# Patient Record
Sex: FEMALE | Race: WHITE | Employment: FULL TIME | URBAN - METROPOLITAN AREA
[De-identification: names, ages, dates, MRNs, and addresses within clinical notes are randomized per-mention and may not be internally consistent; named-entity substitution may affect disease eponyms.]

---

## 2018-10-11 ENCOUNTER — HOSPITAL ENCOUNTER (OUTPATIENT)
Dept: MAMMOGRAPHY | Age: 48
Discharge: HOME | End: 2018-10-13
Payer: COMMERCIAL

## 2018-10-11 DIAGNOSIS — Z12.39 BREAST SCREENING: ICD-10-CM

## 2018-10-11 PROCEDURE — 77067 SCR MAMMO BI INCL CAD: CPT

## 2020-08-19 ENCOUNTER — HOSPITAL ENCOUNTER (OUTPATIENT)
Dept: MAMMOGRAPHY | Age: 50
Discharge: HOME | End: 2020-08-21
Payer: COMMERCIAL

## 2020-08-19 PROCEDURE — 77067 SCR MAMMO BI INCL CAD: CPT

## 2021-01-25 LAB
ALBUMIN SERPL-MCNC: NORMAL G/DL
ALP BLD-CCNC: NORMAL U/L
ALT SERPL-CCNC: NORMAL U/L
ANION GAP SERPL CALCULATED.3IONS-SCNC: NORMAL MMOL/L
AST SERPL-CCNC: NORMAL U/L
BILIRUB SERPL-MCNC: NORMAL MG/DL
BUN BLDV-MCNC: NORMAL MG/DL
CALCIUM SERPL-MCNC: NORMAL MG/DL
CHLORIDE BLD-SCNC: NORMAL MMOL/L
CHOLESTEROL, TOTAL: NORMAL
CHOLESTEROL/HDL RATIO: NORMAL
CO2: NORMAL
CREAT SERPL-MCNC: NORMAL MG/DL
GFR CALCULATED: NORMAL
GLUCOSE BLD-MCNC: NORMAL MG/DL
HDLC SERPL-MCNC: NORMAL MG/DL
LDL CHOLESTEROL CALCULATED: NORMAL
NONHDLC SERPL-MCNC: NORMAL MG/DL
POTASSIUM SERPL-SCNC: NORMAL MMOL/L
SODIUM BLD-SCNC: NORMAL MMOL/L
TOTAL PROTEIN: NORMAL
TRIGL SERPL-MCNC: NORMAL MG/DL
VLDLC SERPL CALC-MCNC: NORMAL MG/DL

## 2021-06-17 RX ORDER — CLINDAMYCIN PHOSPHATE 10 MG/G
GEL TOPICAL 2 TIMES DAILY
COMMUNITY
End: 2022-04-14 | Stop reason: SDUPTHER

## 2022-04-14 ENCOUNTER — OFFICE VISIT (OUTPATIENT)
Dept: PRIMARY CARE CLINIC | Age: 52
End: 2022-04-14
Payer: COMMERCIAL

## 2022-04-14 VITALS
OXYGEN SATURATION: 99 % | SYSTOLIC BLOOD PRESSURE: 118 MMHG | HEART RATE: 79 BPM | DIASTOLIC BLOOD PRESSURE: 76 MMHG | BODY MASS INDEX: 27.72 KG/M2 | WEIGHT: 193.6 LBS | TEMPERATURE: 97.7 F | HEIGHT: 70 IN

## 2022-04-14 DIAGNOSIS — E78.49 HYPERLIPIDEMIA, FAMILIAL, HIGH LDL: Primary | ICD-10-CM

## 2022-04-14 DIAGNOSIS — Z12.31 BREAST CANCER SCREENING BY MAMMOGRAM: ICD-10-CM

## 2022-04-14 DIAGNOSIS — D23.9 SEBACEOUS ADENOMA: ICD-10-CM

## 2022-04-14 DIAGNOSIS — E04.1 LEFT THYROID NODULE: ICD-10-CM

## 2022-04-14 DIAGNOSIS — E83.52 HYPERCALCEMIA SYNDROME: ICD-10-CM

## 2022-04-14 PROCEDURE — 3017F COLORECTAL CA SCREEN DOC REV: CPT | Performed by: INTERNAL MEDICINE

## 2022-04-14 PROCEDURE — 4004F PT TOBACCO SCREEN RCVD TLK: CPT | Performed by: INTERNAL MEDICINE

## 2022-04-14 PROCEDURE — G8427 DOCREV CUR MEDS BY ELIG CLIN: HCPCS | Performed by: INTERNAL MEDICINE

## 2022-04-14 PROCEDURE — 99214 OFFICE O/P EST MOD 30 MIN: CPT | Performed by: INTERNAL MEDICINE

## 2022-04-14 PROCEDURE — G8419 CALC BMI OUT NRM PARAM NOF/U: HCPCS | Performed by: INTERNAL MEDICINE

## 2022-04-14 RX ORDER — CLINDAMYCIN PHOSPHATE 10 MG/G
GEL TOPICAL 2 TIMES DAILY
Qty: 30 G | Refills: 0 | Status: SHIPPED | OUTPATIENT
Start: 2022-04-14

## 2022-04-14 SDOH — ECONOMIC STABILITY: FOOD INSECURITY: WITHIN THE PAST 12 MONTHS, THE FOOD YOU BOUGHT JUST DIDN'T LAST AND YOU DIDN'T HAVE MONEY TO GET MORE.: NEVER TRUE

## 2022-04-14 SDOH — ECONOMIC STABILITY: FOOD INSECURITY: WITHIN THE PAST 12 MONTHS, YOU WORRIED THAT YOUR FOOD WOULD RUN OUT BEFORE YOU GOT MONEY TO BUY MORE.: NEVER TRUE

## 2022-04-14 ASSESSMENT — PATIENT HEALTH QUESTIONNAIRE - PHQ9
SUM OF ALL RESPONSES TO PHQ9 QUESTIONS 1 & 2: 0
1. LITTLE INTEREST OR PLEASURE IN DOING THINGS: 0
2. FEELING DOWN, DEPRESSED OR HOPELESS: 0
SUM OF ALL RESPONSES TO PHQ QUESTIONS 1-9: 0

## 2022-04-14 ASSESSMENT — SOCIAL DETERMINANTS OF HEALTH (SDOH): HOW HARD IS IT FOR YOU TO PAY FOR THE VERY BASICS LIKE FOOD, HOUSING, MEDICAL CARE, AND HEATING?: NOT HARD AT ALL

## 2022-04-14 NOTE — PROGRESS NOTES
Chief Complaint   Patient presents with    1 Year Follow Up     Pt is here as a 1 year F/U, with no c/o discomfort at this time, but pt has a spot on her right upper lip that she states appeared almost 1 year ago. HPI:  Patient is here after almost a year and a half for follow-up of hyperlipidemia and rosacea. She is doing very well she had stopped her Crestor almost a year ago thinking that since her last blood work was doing well and she has been exercising and losing the weight that she would stop it. She denies any current complaints of any shortness of breath chest pains abdominal pains nausea vomiting or diarrhea. She has been walking on a daily basis. She is due for her mammogram which will be ordered. Patient was counseled to have her colonoscopy and she agreed except that she would like to postpone it for few month because she is starting a new job in 2 weeks. She was also advised to get the shingles vaccine. She was counseled in length to get the blood work and figure out if she still needs the medication or not. .  Patient complains of a lesion on the nasolabial fold on the right side suggestive of a clogged sebaceous cyst.  She would like it removed it is about 0.2 centimeter in diameter. She will be referred to plastic surgery. .    Past Medical History, Surgical History, and Family History has been reviewed and updated.     Review of Systems:  Constitutional:  No fever, no fatigue, no chills, no headaches, no weight change  Dermatology:  No rash, no mole, no dry or sensitive skin  ENT:  No cough, no sore throat, no sinus pain, no runny nose, no ear pain  Cardiology:  No chest pain, no palpitations, no leg edema, no shortness of breath, no PND  Gastroenterology:  No dysphagia, no abdominal pain, no nausea, no vomiting, no constipation, no diarrhea, no heartburn  Musculoskeletal:  No joint pain, no leg cramps, no back pain, no muscle aches  Respiratory:  No shortness of breath, no orthopnea, no wheezing, no CONNOR, no hemoptysis  Urology:  No blood in the urine, no urinary frequency, no urinary incontinence, no urinary urgency, no nocturia, no dysuria    Vitals:    04/14/22 1420   BP: 118/76   Pulse: 79   Temp: 97.7 °F (36.5 °C)   TempSrc: Temporal   SpO2: 99%   Weight: 193 lb 9.6 oz (87.8 kg)   Height: 5' 10\" (1.778 m)       General:  Patient alert and oriented x 3, NAD, pleasant  HEENT:  Atraumatic, normocephalic, PERRLA, EOMI, clear conjunctiva, TMs clear, nose-clear, throat - no erythema  Neck:  Supple, no goiter, no carotid bruits, no LAD  Lungs:  CTA   Heart:  RRR, no murmurs, gallops or rubs  Abdomen:  Soft/nt/nd, + bowel sounds  Extremities:  No clubbing, cyanosis or edema  Skin: unremarkable    Sodium   Date Value Ref Range Status   10/10/2015 141 132 - 146 mmol/L Final     Potassium   Date Value Ref Range Status   10/10/2015 4.3 3.5 - 5.0 mmol/L Final     Chloride   Date Value Ref Range Status   10/10/2015 104 98 - 107 mmol/L Final     CO2   Date Value Ref Range Status   10/10/2015 27 22 - 29 mmol/L Final     BUN   Date Value Ref Range Status   10/10/2015 9 6 - 20 mg/dL Final     CREATININE   Date Value Ref Range Status   10/10/2015 1.0 0.5 - 1.0 mg/dL Final     Glucose   Date Value Ref Range Status   10/10/2015 103 74 - 109 mg/dL Final     Calcium   Date Value Ref Range Status   10/10/2015 11.3 (H) 8.6 - 10.2 mg/dL Final     Total Protein   Date Value Ref Range Status   10/09/2015 6.3 (L) 6.4 - 8.3 g/dL Final     Albumin   Date Value Ref Range Status   10/09/2015 3.5 3.5 - 5.2 g/dL Final     Total Bilirubin   Date Value Ref Range Status   10/09/2015 0.2 0.0 - 1.2 mg/dL Final     Alkaline Phosphatase   Date Value Ref Range Status   10/09/2015 112 (H) 35 - 104 U/L Final     AST   Date Value Ref Range Status   10/09/2015 22 0 - 31 U/L Final     ALT   Date Value Ref Range Status   10/09/2015 30 0 - 32 U/L Final     GFR Non-   Date Value Ref Range Status   10/10/2015 60 >=60 mL/min/1.73 Final     Comment:     Chronic Kidney Disease: less than 60 ml/min/1.73 sq.m. Kidney Failure: less than 15 ml/min/1.73 sq.m. Results valid for patients 18 years and older. GFR    Date Value Ref Range Status   10/10/2015 >60  Final        No results found. Assessment/Plan:      Outpatient Encounter Medications as of 4/14/2022   Medication Sig Dispense Refill    clindamycin (CLINDAGEL) 1 % gel Apply topically 2 times daily Apply topically 2 times daily. 30 g 0    [DISCONTINUED] clindamycin (CLINDAGEL) 1 % gel Apply topically 2 times daily Apply topically 2 times daily.  [DISCONTINUED] rosuvastatin (CRESTOR) 10 MG tablet Take 10 mg by mouth nightly (Patient not taking: Reported on 4/14/2022)       No facility-administered encounter medications on file as of 4/14/2022. José Nino was seen today for 1 year follow up. Diagnoses and all orders for this visit:    Hyperlipidemia, familial, high LDL  -     Lipid Panel; Future  -     Comprehensive Metabolic Panel; Future    Breast cancer screening by mammogram  -     PAULA DIGITAL SCREEN W OR WO CAD BILATERAL; Future    Hypercalcemia syndrome  -     CBC with Auto Differential; Future  -     Vitamin D 25 Hydroxy; Future  -     Urinalysis; Future  -     Comprehensive Metabolic Panel; Future    Left thyroid nodule  -     TSH; Future    Sebaceous adenoma  -     Lorenzo Alejandre MD, Plastic & Reconstructive Surgery, [place]    Other orders  -     clindamycin (CLINDAGEL) 1 % gel; Apply topically 2 times daily Apply topically 2 times daily. There are no Patient Instructions on file for this visit. On this date 4/14/2022 I have spent 30 minutes reviewing previous notes, test results and face to face with the patient discussing the diagnosis and importance of compliance with the treatment plan as well as documenting on the day of the visit.       Viry Mota MD   4/14/22

## 2022-04-18 DIAGNOSIS — E04.1 LEFT THYROID NODULE: ICD-10-CM

## 2022-04-18 DIAGNOSIS — E78.49 HYPERLIPIDEMIA, FAMILIAL, HIGH LDL: ICD-10-CM

## 2022-04-18 DIAGNOSIS — E83.52 HYPERCALCEMIA SYNDROME: ICD-10-CM

## 2022-04-18 LAB
BASOPHILS ABSOLUTE: 0.04 E9/L (ref 0–0.2)
BASOPHILS RELATIVE PERCENT: 0.6 % (ref 0–2)
EOSINOPHILS ABSOLUTE: 0.15 E9/L (ref 0.05–0.5)
EOSINOPHILS RELATIVE PERCENT: 2.4 % (ref 0–6)
HCT VFR BLD CALC: 41.9 % (ref 34–48)
HEMOGLOBIN: 13.5 G/DL (ref 11.5–15.5)
IMMATURE GRANULOCYTES #: 0.01 E9/L
IMMATURE GRANULOCYTES %: 0.2 % (ref 0–5)
LYMPHOCYTES ABSOLUTE: 2.8 E9/L (ref 1.5–4)
LYMPHOCYTES RELATIVE PERCENT: 45.2 % (ref 20–42)
MCH RBC QN AUTO: 29.2 PG (ref 26–35)
MCHC RBC AUTO-ENTMCNC: 32.2 % (ref 32–34.5)
MCV RBC AUTO: 90.7 FL (ref 80–99.9)
MONOCYTES ABSOLUTE: 0.57 E9/L (ref 0.1–0.95)
MONOCYTES RELATIVE PERCENT: 9.2 % (ref 2–12)
NEUTROPHILS ABSOLUTE: 2.62 E9/L (ref 1.8–7.3)
NEUTROPHILS RELATIVE PERCENT: 42.4 % (ref 43–80)
PDW BLD-RTO: 12.4 FL (ref 11.5–15)
PLATELET # BLD: 218 E9/L (ref 130–450)
PMV BLD AUTO: 11 FL (ref 7–12)
RBC # BLD: 4.62 E12/L (ref 3.5–5.5)
WBC # BLD: 6.2 E9/L (ref 4.5–11.5)

## 2022-04-19 LAB
ALBUMIN SERPL-MCNC: 4.2 G/DL (ref 3.5–5.2)
ALP BLD-CCNC: 65 U/L (ref 35–104)
ALT SERPL-CCNC: 27 U/L (ref 0–32)
ANION GAP SERPL CALCULATED.3IONS-SCNC: 12 MMOL/L (ref 7–16)
AST SERPL-CCNC: 31 U/L (ref 0–31)
BILIRUB SERPL-MCNC: 0.4 MG/DL (ref 0–1.2)
BUN BLDV-MCNC: 11 MG/DL (ref 6–20)
CALCIUM SERPL-MCNC: 9.2 MG/DL (ref 8.6–10.2)
CHLORIDE BLD-SCNC: 107 MMOL/L (ref 98–107)
CHOLESTEROL, TOTAL: 179 MG/DL (ref 0–199)
CO2: 25 MMOL/L (ref 22–29)
CREAT SERPL-MCNC: 0.9 MG/DL (ref 0.5–1)
GFR AFRICAN AMERICAN: >60
GFR NON-AFRICAN AMERICAN: >60 ML/MIN/1.73
GLUCOSE BLD-MCNC: 117 MG/DL (ref 74–99)
HDLC SERPL-MCNC: 42 MG/DL
LDL CHOLESTEROL CALCULATED: 112 MG/DL (ref 0–99)
POTASSIUM SERPL-SCNC: 3.9 MMOL/L (ref 3.5–5)
SODIUM BLD-SCNC: 144 MMOL/L (ref 132–146)
TOTAL PROTEIN: 7.1 G/DL (ref 6.4–8.3)
TRIGL SERPL-MCNC: 127 MG/DL (ref 0–149)
TSH SERPL DL<=0.05 MIU/L-ACNC: 2.53 UIU/ML (ref 0.27–4.2)
VITAMIN D 25-HYDROXY: 31 NG/ML (ref 30–100)
VLDLC SERPL CALC-MCNC: 25 MG/DL

## 2022-04-20 DIAGNOSIS — E83.52 HYPERCALCEMIA SYNDROME: ICD-10-CM

## 2022-04-20 LAB
BACTERIA: NORMAL /HPF
BILIRUBIN URINE: NEGATIVE
BLOOD, URINE: ABNORMAL
CLARITY: CLEAR
COLOR: YELLOW
EPITHELIAL CELLS, UA: NORMAL /HPF
GLUCOSE URINE: NEGATIVE MG/DL
KETONES, URINE: NEGATIVE MG/DL
LEUKOCYTE ESTERASE, URINE: ABNORMAL
NITRITE, URINE: NEGATIVE
PH UA: 6 (ref 5–9)
PROTEIN UA: NEGATIVE MG/DL
RBC UA: NORMAL /HPF (ref 0–2)
SPECIFIC GRAVITY UA: 1.02 (ref 1–1.03)
UROBILINOGEN, URINE: 0.2 E.U./DL
WBC UA: NORMAL /HPF (ref 0–5)

## 2022-04-21 ENCOUNTER — OFFICE VISIT (OUTPATIENT)
Dept: PRIMARY CARE CLINIC | Age: 52
End: 2022-04-21
Payer: COMMERCIAL

## 2022-04-21 VITALS
SYSTOLIC BLOOD PRESSURE: 122 MMHG | HEART RATE: 86 BPM | OXYGEN SATURATION: 95 % | DIASTOLIC BLOOD PRESSURE: 60 MMHG | TEMPERATURE: 97.8 F | BODY MASS INDEX: 27.75 KG/M2 | WEIGHT: 193.8 LBS | HEIGHT: 70 IN

## 2022-04-21 DIAGNOSIS — R73.01 IFG (IMPAIRED FASTING GLUCOSE): ICD-10-CM

## 2022-04-21 DIAGNOSIS — E55.9 VITAMIN D DEFICIENCY: ICD-10-CM

## 2022-04-21 DIAGNOSIS — E78.2 MODERATE MIXED HYPERLIPIDEMIA NOT REQUIRING STATIN THERAPY: Primary | ICD-10-CM

## 2022-04-21 PROCEDURE — 3017F COLORECTAL CA SCREEN DOC REV: CPT | Performed by: INTERNAL MEDICINE

## 2022-04-21 PROCEDURE — G8419 CALC BMI OUT NRM PARAM NOF/U: HCPCS | Performed by: INTERNAL MEDICINE

## 2022-04-21 PROCEDURE — 4004F PT TOBACCO SCREEN RCVD TLK: CPT | Performed by: INTERNAL MEDICINE

## 2022-04-21 PROCEDURE — 99213 OFFICE O/P EST LOW 20 MIN: CPT | Performed by: INTERNAL MEDICINE

## 2022-04-21 PROCEDURE — G8427 DOCREV CUR MEDS BY ELIG CLIN: HCPCS | Performed by: INTERNAL MEDICINE

## 2022-04-21 NOTE — PROGRESS NOTES
Chief Complaint   Patient presents with    Hyperlipidemia     Pt is here as a 1 week F/U, with labs drawn on 4/18 and available for review. HPI:  Patient is here for follow-up of hyperlipidemia impaired glucose and hypovitaminosis D. She is doing very well without any new complaints. Blood work was discussed with patient appears within reasonable range except for mild impaired fasting hyperglycemia with fasting blood sugar of 117 but she had a big load of sugar the night before  Her lipid panel was discussed with her and her LDL is down to 112 just with diet alone. She was counseled about the colonoscopy but she cannot get it done yet because she is transferring to a new job and she has not gotten over that until May. She was encouraged to come back in 6 months to follow-up on her fasting hyperglycemia and to schedule her colonoscopy and mammogram..    Past Medical History, Surgical History, and Family History has been reviewed and updated.     Review of Systems:  Constitutional:  No fever, no fatigue, no chills, no headaches, no weight change  Dermatology:  No rash, no mole, no dry or sensitive skin  ENT:  No cough, no sore throat, no sinus pain, no runny nose, no ear pain  Cardiology:  No chest pain, no palpitations, no leg edema, no shortness of breath, no PND  Gastroenterology:  No dysphagia, no abdominal pain, no nausea, no vomiting, no constipation, no diarrhea, no heartburn  Musculoskeletal:  No joint pain, no leg cramps, no back pain, no muscle aches  Respiratory:  No shortness of breath, no orthopnea, no wheezing, no CONNOR, no hemoptysis  Urology:  No blood in the urine, no urinary frequency, no urinary incontinence, no urinary urgency, no nocturia, no dysuria    Vitals:    04/21/22 1355   BP: 122/60   Pulse: 86   Temp: 97.8 °F (36.6 °C)   TempSrc: Temporal   SpO2: 95%   Weight: 193 lb 12.8 oz (87.9 kg)   Height: 5' 10\" (1.778 m)       General:  Patient alert and oriented x 3, NAD, pleasant  HEENT: Atraumatic, normocephalic, PERRLA, EOMI, clear conjunctiva, TMs clear, nose-clear, throat - no erythema  Neck:  Supple, no goiter, no carotid bruits, no LAD  Lungs:  CTA   Heart:  RRR, no murmurs, gallops or rubs  Abdomen:  Soft/nt/nd, + bowel sounds  Extremities:  No clubbing, cyanosis or edema  Skin: unremarkable    Cholesterol, Total   Date Value Ref Range Status   04/18/2022 179 0 - 199 mg/dL Final     Triglycerides   Date Value Ref Range Status   04/18/2022 127 0 - 149 mg/dL Final     HDL   Date Value Ref Range Status   04/18/2022 42 >40 mg/dL Final     LDL Calculated   Date Value Ref Range Status   04/18/2022 112 (H) 0 - 99 mg/dL Final     VLDL Cholesterol Calculated   Date Value Ref Range Status   04/18/2022 25 mg/dL Final     Sodium   Date Value Ref Range Status   04/18/2022 144 132 - 146 mmol/L Final     Potassium   Date Value Ref Range Status   04/18/2022 3.9 3.5 - 5.0 mmol/L Final     Chloride   Date Value Ref Range Status   04/18/2022 107 98 - 107 mmol/L Final     CO2   Date Value Ref Range Status   04/18/2022 25 22 - 29 mmol/L Final     BUN   Date Value Ref Range Status   04/18/2022 11 6 - 20 mg/dL Final     CREATININE   Date Value Ref Range Status   04/18/2022 0.9 0.5 - 1.0 mg/dL Final     Glucose   Date Value Ref Range Status   04/18/2022 117 (H) 74 - 99 mg/dL Final     Calcium   Date Value Ref Range Status   04/18/2022 9.2 8.6 - 10.2 mg/dL Final     Total Protein   Date Value Ref Range Status   04/18/2022 7.1 6.4 - 8.3 g/dL Final     Albumin   Date Value Ref Range Status   04/18/2022 4.2 3.5 - 5.2 g/dL Final     Total Bilirubin   Date Value Ref Range Status   04/18/2022 0.4 0.0 - 1.2 mg/dL Final     Alkaline Phosphatase   Date Value Ref Range Status   04/18/2022 65 35 - 104 U/L Final     AST   Date Value Ref Range Status   04/18/2022 31 0 - 31 U/L Final     ALT   Date Value Ref Range Status   04/18/2022 27 0 - 32 U/L Final     GFR Non-   Date Value Ref Range Status   04/18/2022 >60 >=60 mL/min/1.73 Final     Comment:     Chronic Kidney Disease: less than 60 ml/min/1.73 sq.m. Kidney Failure: less than 15 ml/min/1.73 sq.m. Results valid for patients 18 years and older. GFR    Date Value Ref Range Status   04/18/2022 >60  Final        No results found. Assessment/Plan:      Outpatient Encounter Medications as of 4/21/2022   Medication Sig Dispense Refill    clindamycin (CLINDAGEL) 1 % gel Apply topically 2 times daily Apply topically 2 times daily. 30 g 0     No facility-administered encounter medications on file as of 4/21/2022. [facility] was seen today for hyperlipidemia. Diagnoses and all orders for this visit:    Moderate mixed hyperlipidemia not requiring statin therapy    IFG (impaired fasting glucose)    Vitamin D deficiency         There are no Patient Instructions on file for this visit. On this date 4/21/2022 I have spent 25 minutes reviewing previous notes, test results and face to face with the patient discussing the diagnosis and importance of compliance with the treatment plan as well as documenting on the day of the visit.       Zaynab Campos MD   4/21/22

## 2022-06-24 ENCOUNTER — OFFICE VISIT (OUTPATIENT)
Dept: SURGERY | Age: 52
End: 2022-06-24
Payer: COMMERCIAL

## 2022-06-24 VITALS
WEIGHT: 192 LBS | SYSTOLIC BLOOD PRESSURE: 122 MMHG | HEIGHT: 71 IN | DIASTOLIC BLOOD PRESSURE: 78 MMHG | BODY MASS INDEX: 26.88 KG/M2 | HEART RATE: 83 BPM | OXYGEN SATURATION: 97 % | TEMPERATURE: 98 F

## 2022-06-24 DIAGNOSIS — K13.0 LESION OF LIP: Primary | ICD-10-CM

## 2022-06-24 PROCEDURE — 99204 OFFICE O/P NEW MOD 45 MIN: CPT | Performed by: PLASTIC SURGERY

## 2022-06-24 RX ORDER — LIDOCAINE HYDROCHLORIDE AND EPINEPHRINE 10; 10 MG/ML; UG/ML
20 INJECTION, SOLUTION INFILTRATION; PERINEURAL ONCE
Status: SHIPPED | OUTPATIENT
Start: 2022-06-24

## 2022-06-24 RX ORDER — LIDOCAINE HYDROCHLORIDE AND EPINEPHRINE 10; 10 MG/ML; UG/ML
3 INJECTION, SOLUTION INFILTRATION; PERINEURAL ONCE
Status: COMPLETED | OUTPATIENT
Start: 2022-06-24 | End: 2022-06-24

## 2022-06-24 RX ORDER — LIDOCAINE HYDROCHLORIDE AND EPINEPHRINE 10; 10 MG/ML; UG/ML
20 INJECTION, SOLUTION INFILTRATION; PERINEURAL ONCE
Qty: 20 ML | Refills: 0 | Status: SHIPPED
Start: 2022-06-24 | End: 2022-06-24

## 2022-06-24 RX ADMIN — LIDOCAINE HYDROCHLORIDE AND EPINEPHRINE 3 ML: 10; 10 INJECTION, SOLUTION INFILTRATION; PERINEURAL at 10:50

## 2022-06-24 NOTE — PROGRESS NOTES
Department of Plastic Surgery - Adult  Attending Consult Note      CHIEF COMPLAINT:   Mass of right face     History Obtained From:  patient    HISTORY OF PRESENT ILLNESS:                The patient is a 46 y.o. female who presents with right face/superior to upper right lip mass. The patient states that they first noticed the mass 2 ago. It has  grown in size since they first noticed the mass. The mass has not had a history of discharge. The pt has not had the mass biopsied previously. The patient states the mass is  Irritating with masks. The pt denies any associated symptoms. Former smoker, currently vapes. Past Medical History:    Past Medical History:   Diagnosis Date    Bacterial cellulitis     Chronic asthmatic bronchitis with acute exacerbation ([address] 10/9/2015    Chronic granulomatous infection due mostly to Staphylococcus aureus     Fatigue     Hypercalcemia due to hyperthyroidism     [facility]     IFG (impaired fasting glucose)     Left thyroid nodule 10/9/2015    Mixed hypercholesterolemia and hypertriglyceridemia     Sialoadenitis     Vitamin D deficiency      Past Surgical History:    Past Surgical History:   Procedure Laterality Date    ANKLE SURGERY      surgery 2012. Pins and screws taken out feb 2014    CYST REMOVAL      bilateral axilla/arms    EXCISION OF PARATHYROID MASS  2015    HYSTERECTOMY (CERVIX STATUS UNKNOWN)      TONSILLECTOMY       Current Medications:      Current Outpatient Medications   Medication Sig Dispense Refill    clindamycin (CLINDAGEL) 1 % gel Apply topically 2 times daily Apply topically 2 times daily. (Patient not taking: Reported on 6/24/2022) 30 g 0     No current facility-administered medications for this visit. Allergies:  Patient has no known allergies.     Social History:   Social History     Socioeconomic History    Marital status:      Spouse name: Not on file    Number of children: Not on file    Years of education: Not on file    Highest education level: Not on file   Occupational History    Not on file   Tobacco Use    Smoking status: Current Every Day Smoker     Packs/day: 0.25     Years: 36.00     Pack years: 9.00     Types: Cigarettes     Start date: 12    Smokeless tobacco: Never Used    Tobacco comment: vaping presently cigarettes past   Vaping Use    Vaping Use: Every day    Substances: Nicotine, Flavoring    Devices: Disposable   Substance and Sexual Activity    Alcohol use: Yes     Comment: occassional    Drug use: No    Sexual activity: Not on file   Other Topics Concern    Not on file   Social History Narrative    Not on file     Social Determinants of Health     Financial Resource Strain: Low Risk     Difficulty of Paying Living Expenses: Not hard at all   Food Insecurity: No Food Insecurity    Worried About Running Out of Food in the Last Year: Never true    Daja of Food in the Last Year: Never true   Transportation Needs:     Lack of Transportation (Medical): Not on file    Lack of Transportation (Non-Medical):  Not on file   Physical Activity:     Days of Exercise per Week: Not on file    Minutes of Exercise per Session: Not on file   Stress:     Feeling of Stress : Not on file   Social Connections:     Frequency of Communication with Friends and Family: Not on file    Frequency of Social Gatherings with Friends and Family: Not on file    Attends Congregation Services: Not on file    Active Member of Clubs or Organizations: Not on file    Attends Club or Organization Meetings: Not on file    Marital Status: Not on file   Intimate Partner Violence:     Fear of Current or Ex-Partner: Not on file    Emotionally Abused: Not on file    Physically Abused: Not on file    Sexually Abused: Not on file   Housing Stability:     Unable to Pay for Housing in the Last Year: Not on file    Number of [place] in the Last Year: Not on file    Unstable Housing in the Last Year: Not on file     Family History:   Family History   Problem Relation Age of Onset    Diabetes Mother     High Cholesterol Mother     Diabetes Father     High Cholesterol Father        REVIEW OF SYSTEMS:    CONSTITUTIONAL:  negative for  fevers, chills, sweats and fatigue  EYES: negative for dipolpia or acute vision loss. RESPIRATORY:  negative for  dry cough, cough with sputum, dyspnea, wheezing and chest pain  HENT:negative for pain, headache, difficulty swallowing or nose bleeds. CARDIOVASCULAR:  negative for  chest pain, dyspnea, palpitations, syncope  GASTROINTESTINAL:  negative for nausea, vomiting, change in bowel habits, diarrhea, constipation and abdominal pain  EXTREMITIES: negative for edema  MUSCULOSKELETAL: negative for muscle weakness  SKIN: positive for lesionnegative for itching or rashes. HEME: negative for easy brusing or bleeding  BEHAVIOR/PSYCH:  negative for poor appetite, increased appetite, decreased sleep and poor concentration      PHYSICAL EXAM:    VITALS:  /78 (Site: Left Upper Arm, Position: Sitting, Cuff Size: Medium Adult)   Pulse 83   Temp 98 °F (36.7 °C) (Temporal)   Ht 5' 11\" (1.803 m)   Wt 192 lb (87.1 kg)   SpO2 97%   Breastfeeding No   BMI 26.78 kg/m²   CONSTITUTIONAL:  awake, alert, cooperative, no apparent distress, and appears stated age  EYES: PERRLA, EOMI, no signs of occular infection  LUNGS:  No increased work of breathing, good air exchange, clear to auscultation bilaterally, no crackles or wheezing  CARDIOVASCULAR:  Normal apical impulse, regular rate and rhythm,   EXTREMITIES: no signs of clubbing or cyanosis. MUSCULOSKELETAL: negative for flaccid muscle tone or spastic movements. NEURO: Cranial nerves II-XII grossly intact. No signs of agitated mood. SKIN: right upper lip skin-  4mm x  5mm x 2mm,   raised, no signs of bleeding,drainage or infection. not tender to palpation.      DATA:    Labs: CBC:   Lab Results   Component Value Date    WBC 6.2 04/18/2022    RBC 4.62 04/18/2022    HGB 13.5 04/18/2022    HCT 41.9 04/18/2022    MCV 90.7 04/18/2022    MCH 29.2 04/18/2022    MCHC 32.2 04/18/2022    RDW 12.4 04/18/2022     04/18/2022    MPV 11.0 04/18/2022     BMP:    Lab Results   Component Value Date     04/18/2022    K 3.9 04/18/2022     04/18/2022    CO2 25 04/18/2022    BUN 11 04/18/2022    LABALBU 4.2 04/18/2022    CREATININE 0.9 04/18/2022    CALCIUM 9.2 04/18/2022    GFRAA >60 04/18/2022    LABGLOM >60 04/18/2022    GLUCOSE 117 04/18/2022       Radiology Review:  No radiology needed at this time    IMPRESSION/RECOMMENDATIONS:        Diagnosis  -) Right upper lip skin lesion of uncertain etiology       -The patient was counseled on the need for surgical intervention to have the mass biopsied to rule out malignancy     -The risks, benefits and options were discussed with the pt. The risks included but not limited to pain, bleeding, infection, heavy scarring, damage to surrounding structures, fluid collections, asymmetry, and need for further procedures. All of Her questions were answered to their satisfaction and She agrees to proceed with the procedure.  -After consent was obtained, the area was cleansed with an alcohol swab. Local anesthesia consisting of 1% lidocaine with 1:100,000 epinepherine was injected  surrounding the area. The local was allowed to work. Using a 10-blade the lesion was shaved, hemostasis was obtained with Monsel solution and ointment was placed. The procedure was performed by Dr. Orestes Samuel    The patient was educated to keep the bandage in place and apply bacitracin to the wound site BID. OK to shower at this time. Advised the patient that they can allow soap and water to rinse of the wound site while showering. Once they are done in the shower they are to pat dry the incision site with a clean paper towel. No baths, hot tubs or soaking of the wound site at this time. Pt voices understanding.      I have discussed with the patient that they should make every attempt to follow-up within this time interval in the event that the pathology or radiographic findings require further attention such as surgical or other medical intervention. They voiced complete understanding. The patients history, physical exam, assessment and plan were reviewed in detail with Dr. Saulo Rosario he agrees with assessment and initial plan. Call with signs of infection or fever (Increase in pain, redness, or drainage)  Follow up 1 week, will call with results of the biopsy    Attending Physician Statement  I have discussed the case, including pertinent history and exam findings with the resident. I have seen and examined the patient and the key elements of all parts of the encounter have been performed by me. I agree with the assessment, exam, plan and orders as documented by the resident. Discussed the possibility of requiring additional procedures including additional surgical excision, versus additional procedures such as laser if there is abnormal scarring or return of the lesion if this is deemed benign. I attest that the patient was seen and examined by me, and concur with the documentation above. I agree with the assessment and the plan outlined. This document is generated, in part, by voice recognition software and thus  syntax and grammatical errors are possible.     Kleber Mccall

## 2022-07-08 ENCOUNTER — SCHEDULED TELEPHONE ENCOUNTER (OUTPATIENT)
Dept: SURGERY | Age: 52
End: 2022-07-08

## 2022-07-08 DIAGNOSIS — D36.10 NEUROMA: Primary | ICD-10-CM

## 2022-07-08 PROCEDURE — 99999 PR OFFICE/OUTPT VISIT,PROCEDURE ONLY: CPT | Performed by: PHYSICIAN ASSISTANT

## 2022-07-08 NOTE — PROGRESS NOTES
Ayaka Grewal is a 46 y.o. female evaluated via telephone on 7/8/2022. Consent:  She and/or health care decision maker is aware that that she may receive a bill for this telephone service, depending on her insurance coverage, and has provided verbal consent to proceed: Yes    The patient was in the [facility] [place] during the entirety of the phone conversation. Documentation:  I communicated with the patient and/or health care decision maker about the pathology results from their most recent biopsy.    Details of this discussion including any medical advice provided:     Pathology     [address]     [address] [facility]            [address] [facility], [facility] [place], [address] [address]               FINAL SURGICAL PATHOLOGY REPORT     NAME:            SHILPA MONTEJO            Date of       06/24/2022                                            Collection:   Medical Record   [id number]              Date of       06/27/2022   Number:                                  Receipt:   Age:  48 Y        Sex:  F                Date          06/30/2022 12:29                                            Reported:   Date Of Birth:   1970   Financial        [id number]            Admitting     SIVI   Number:                                  Physician:   Patient          MARCI                   Ordering      FANNY KIMBALL   Location:                                Physician:     Accession Number:  [id number]                                            Additional Physicians:PAT MORALES       Diagnosis:   Skin lesion, right upper lip, biopsy: Palisaded encapsulated neuroma     Comment:    Intradepartmental consultation is obtained.           Chevy Chow M.D.   Quinten Angel                                   (Electronic Signature)         Specimen Submitted:     SKIN LESION, RIGHT UPPER LIP      Preoperative Dx:   Lesion of lip         Microscopic Evaluation: Was performed. Gross Description:   Submitted in one part in formalin labeled \"Deborah Patel, right upper lip lesion\" is a 0.5 x 0.5 x 0.2 cm oval shaped   tangential shave biopsy of white tan skin. The surgical margin is inked   in black. Bisected through its base and entirely submitted. Block label   A1. (Selma Tate)     CODES:   [id number]; I explained to the patient their pathology results which are benign in nature. I explained to the patient that they can discontinue bacitracin to the biopsy site and allow the wound to heal by secondary intention. They can cover with a Band-Aid for continued wound covering if there is any open granulation tissue. I finally explained to the patient that they can begin scar massage once the wound is well-healed and to keep sunscreen over this when they were out in the sun as to have optimal scarring. Lastly I informed the patient that although the biopsy results are benign this lesion can ultimately return in the future. Explained to the patient should the lesion return the future to monitor and with any changes bring these changes to our attention. I educated the patient on her pathology results which is a neuroma. Explained the patient this is benign in nature she can begin massage and scar care to the area at this time. She voices understanding and appreciation    I affirm this is a Patient Initiated Episode with a Patient who has not had a related appointment within my department in the past 7 days or scheduled within the next 24 hours.     Patient identification was verified at the start of the visit: Yes    Total Time: minutes: <5 minutes (not billable)    The

## 2023-01-11 ENCOUNTER — COMMUNITY OUTREACH (OUTPATIENT)
Dept: PRIMARY CARE CLINIC | Age: 53
End: 2023-01-11

## 2023-01-11 NOTE — PROGRESS NOTES
Patient's HM shows they are overdue for Colorectal Screening and mammogram screening. Care Everywhere and  files searched. No results to attach to order nor HM updated.

## 2023-07-28 ENCOUNTER — OFFICE VISIT (OUTPATIENT)
Dept: SURGERY | Age: 53
End: 2023-07-28
Payer: COMMERCIAL

## 2023-07-28 VITALS — HEIGHT: 70 IN | WEIGHT: 199 LBS | BODY MASS INDEX: 28.49 KG/M2 | TEMPERATURE: 97.9 F

## 2023-07-28 DIAGNOSIS — D36.10 NEUROMA: Primary | ICD-10-CM

## 2023-07-28 PROCEDURE — 99203 OFFICE O/P NEW LOW 30 MIN: CPT | Performed by: PHYSICIAN ASSISTANT

## 2023-07-28 NOTE — PROGRESS NOTES
Department of Plastic Surgery - Adult  Attending Consult Note      CHIEF COMPLAINT:   Mass of upper lip    History Obtained From:  patient    HISTORY OF PRESENT ILLNESS:                The patient is a 46 y.o. female who presents with right upper lip mass. The patient was previously seen in our office a year ago for a lesion of uncertain behavior which was shave biopsy. The lesion returned as a neuroma. She states that since that time the lesion has returned and is now larger. She states that it is more symptomatic causing some pinpoint pain and tenderness as well as getting caught at times on her articles of clothing and becoming more pruritic. She states that she will scratch at the area and cause pinpoint bleeding as it is elevated over the dermis. She presents her office today to have the area examined. Past Medical History:    Past Medical History:   Diagnosis Date    Bacterial cellulitis     Chronic asthmatic bronchitis with acute exacerbation ([address]) 10/9/2015    Chronic granulomatous infection due mostly to Staphylococcus aureus     Fatigue     Hypercalcemia due to hyperthyroidism     Hyperparathyroidism ([address])     IFG (impaired fasting glucose)     Left thyroid nodule 10/9/2015    Mixed hypercholesterolemia and hypertriglyceridemia     Sialoadenitis     Vitamin D deficiency      Past Surgical History:    Past Surgical History:   Procedure Laterality Date    ANKLE SURGERY      surgery 2012. Pins and screws taken out feb 2014    CYST REMOVAL      bilateral axilla/arms    EXCISION OF PARATHYROID MASS  2015    HYSTERECTOMY (CERVIX STATUS UNKNOWN)      TONSILLECTOMY       Current Medications:      Current Outpatient Medications   Medication Sig Dispense Refill    clindamycin (CLINDAGEL) 1 % gel Apply topically 2 times daily Apply topically 2 times daily.  (Patient not taking: Reported on 6/24/2022) 30 g 0     Current Facility-Administered Medications   Medication Dose Route Frequency Provider Last Rate

## 2023-08-03 ENCOUNTER — OFFICE VISIT (OUTPATIENT)
Dept: PRIMARY CARE CLINIC | Age: 53
End: 2023-08-03
Payer: COMMERCIAL

## 2023-08-03 VITALS
SYSTOLIC BLOOD PRESSURE: 128 MMHG | OXYGEN SATURATION: 94 % | BODY MASS INDEX: 29.43 KG/M2 | HEIGHT: 70 IN | HEART RATE: 82 BPM | TEMPERATURE: 96.8 F | WEIGHT: 205.6 LBS | DIASTOLIC BLOOD PRESSURE: 70 MMHG

## 2023-08-03 DIAGNOSIS — E78.2 MODERATE MIXED HYPERLIPIDEMIA NOT REQUIRING STATIN THERAPY: ICD-10-CM

## 2023-08-03 DIAGNOSIS — Z12.11 COLON CANCER SCREENING: ICD-10-CM

## 2023-08-03 DIAGNOSIS — Z12.31 BREAST CANCER SCREENING BY MAMMOGRAM: ICD-10-CM

## 2023-08-03 DIAGNOSIS — Z11.59 NEED FOR HEPATITIS C SCREENING TEST: ICD-10-CM

## 2023-08-03 DIAGNOSIS — R73.01 IFG (IMPAIRED FASTING GLUCOSE): Primary | ICD-10-CM

## 2023-08-03 LAB — HBA1C MFR BLD: 5.7 %

## 2023-08-03 PROCEDURE — 83037 HB GLYCOSYLATED A1C HOME DEV: CPT | Performed by: INTERNAL MEDICINE

## 2023-08-03 PROCEDURE — 99214 OFFICE O/P EST MOD 30 MIN: CPT | Performed by: INTERNAL MEDICINE

## 2023-08-03 SDOH — ECONOMIC STABILITY: HOUSING INSECURITY
IN THE LAST 12 MONTHS, WAS THERE A TIME WHEN YOU DID NOT HAVE A STEADY PLACE TO SLEEP OR SLEPT IN A SHELTER (INCLUDING NOW)?: NO

## 2023-08-03 SDOH — ECONOMIC STABILITY: FOOD INSECURITY: WITHIN THE PAST 12 MONTHS, YOU WORRIED THAT YOUR FOOD WOULD RUN OUT BEFORE YOU GOT MONEY TO BUY MORE.: NEVER TRUE

## 2023-08-03 SDOH — ECONOMIC STABILITY: FOOD INSECURITY: WITHIN THE PAST 12 MONTHS, THE FOOD YOU BOUGHT JUST DIDN'T LAST AND YOU DIDN'T HAVE MONEY TO GET MORE.: NEVER TRUE

## 2023-08-03 SDOH — ECONOMIC STABILITY: INCOME INSECURITY: HOW HARD IS IT FOR YOU TO PAY FOR THE VERY BASICS LIKE FOOD, HOUSING, MEDICAL CARE, AND HEATING?: NOT HARD AT ALL

## 2023-08-03 ASSESSMENT — PATIENT HEALTH QUESTIONNAIRE - PHQ9
SUM OF ALL RESPONSES TO PHQ QUESTIONS 1-9: 0
SUM OF ALL RESPONSES TO PHQ QUESTIONS 1-9: 0
1. LITTLE INTEREST OR PLEASURE IN DOING THINGS: 0
SUM OF ALL RESPONSES TO PHQ QUESTIONS 1-9: 0
SUM OF ALL RESPONSES TO PHQ9 QUESTIONS 1 & 2: 0
2. FEELING DOWN, DEPRESSED OR HOPELESS: 0
SUM OF ALL RESPONSES TO PHQ QUESTIONS 1-9: 0

## 2023-08-23 ENCOUNTER — HOSPITAL ENCOUNTER (OUTPATIENT)
Dept: MAMMOGRAPHY | Age: 53
Discharge: HOME | End: 2023-08-25
Payer: COMMERCIAL

## 2023-08-23 VITALS — WEIGHT: 205 LBS | BODY MASS INDEX: 29.35 KG/M2 | HEIGHT: 70 IN

## 2023-08-23 DIAGNOSIS — Z12.31 BREAST CANCER SCREENING BY MAMMOGRAM: ICD-10-CM

## 2023-08-23 PROCEDURE — 77063 BREAST TOMOSYNTHESIS BI: CPT

## 2023-10-11 ENCOUNTER — PROCEDURE VISIT (OUTPATIENT)
Dept: SURGERY | Age: 53
End: 2023-10-11
Payer: COMMERCIAL

## 2023-10-11 DIAGNOSIS — K13.0 LESION OF LIP: ICD-10-CM

## 2023-10-11 DIAGNOSIS — D36.11: Primary | ICD-10-CM

## 2023-10-11 PROCEDURE — 11441 EXC FACE-MM B9+MARG 0.6-1 CM: CPT | Performed by: PLASTIC SURGERY

## 2023-10-11 PROCEDURE — 12051 INTMD RPR FACE/MM 2.5 CM/<: CPT | Performed by: PLASTIC SURGERY

## 2023-10-17 LAB — SURGICAL PATHOLOGY REPORT: NORMAL

## 2023-10-23 ENCOUNTER — OFFICE VISIT (OUTPATIENT)
Dept: SURGERY | Age: 53
End: 2023-10-23

## 2023-10-23 VITALS — TEMPERATURE: 97.4 F

## 2023-10-23 DIAGNOSIS — D36.11: Primary | ICD-10-CM

## 2023-10-23 PROCEDURE — 99024 POSTOP FOLLOW-UP VISIT: CPT | Performed by: PHYSICIAN ASSISTANT

## 2023-10-23 RX ORDER — LIDOCAINE HYDROCHLORIDE AND EPINEPHRINE BITARTRATE 20; .01 MG/ML; MG/ML
1 INJECTION, SOLUTION SUBCUTANEOUS ONCE
Status: COMPLETED | OUTPATIENT
Start: 2023-10-23 | End: 2023-10-23

## 2023-10-23 RX ADMIN — LIDOCAINE HYDROCHLORIDE AND EPINEPHRINE BITARTRATE 1 ML: 20; .01 INJECTION, SOLUTION SUBCUTANEOUS at 10:05

## 2023-10-23 NOTE — PROGRESS NOTES
Subjective: Follow up today for upper lip lesion of uncertain behavior. Denies fever, nausea, vomiting, leg pain or swelling. The patient voices understanding of the procedure they are having today and would like to proceed. Patient states that the mass has been painful and growing. Objective: There were no vitals taken for this visit. Right upper lip lesion of uncertain behavior  Lesion- 2mm x 2mm  Margin- 2mm  Defect- 6mm x 6mm  Scar-10mm         Assessment:    Patient Active Problem List   Diagnosis    Hypercalcemia syndrome    Hydradenitis    Chronic asthmatic bronchitis with acute exacerbation (HCC)    Moderate mixed hyperlipidemia not requiring statin therapy    Personal history of tobacco use    Left thyroid nodule    Sebaceous adenoma    IFG (impaired fasting glucose)    Vitamin D deficiency       Plan:       Diagnosis  -)   Right upper lip lesion of uncertain behavior  -) Pain    -The risks, benefits and options were discussed with the pt. The risks included but not limited to pain, bleeding, infection, heavy scarring, damage to surrounding structures, fluid collections, asymmetry, and need for further procedures. All of Her questions were answered to their satisfaction and She agrees to proceed with the procedure.      -After consent was obtained, the area was cleansed with an alcohol swab. Local anesthesia consisting of 1% lidocaine with 1:100,000 epinepherine was injected  surrounding the area. The local was allowed to work. Using a 10-blade the   Right upper lip lesion of uncertain behavior was excised,  Intermediate  repair was performed. The wound(s) were closed with 5-0 Monocryl and 5-0 fast absorbing gut suture , hemostasis was obtained and a dressing was placed over the wound. The procedure was performed by Dr Di Cohn    Please schedule an appointment to be seen on Follow-up with our office in 7-14 days  Diet: regular diet  Activity: no heavy lifting for 7 days.

## 2024-06-11 ENCOUNTER — OFFICE VISIT (OUTPATIENT)
Dept: PRIMARY CARE CLINIC | Age: 54
End: 2024-06-11
Payer: COMMERCIAL

## 2024-06-11 VITALS
SYSTOLIC BLOOD PRESSURE: 124 MMHG | OXYGEN SATURATION: 93 % | TEMPERATURE: 97.3 F | WEIGHT: 198 LBS | HEIGHT: 70 IN | HEART RATE: 88 BPM | DIASTOLIC BLOOD PRESSURE: 76 MMHG | BODY MASS INDEX: 28.35 KG/M2

## 2024-06-11 DIAGNOSIS — L65.9 THINNING HAIR: ICD-10-CM

## 2024-06-11 DIAGNOSIS — R73.01 IFG (IMPAIRED FASTING GLUCOSE): Primary | ICD-10-CM

## 2024-06-11 DIAGNOSIS — E04.1 LEFT THYROID NODULE: ICD-10-CM

## 2024-06-11 DIAGNOSIS — E78.2 MODERATE MIXED HYPERLIPIDEMIA NOT REQUIRING STATIN THERAPY: ICD-10-CM

## 2024-06-11 PROCEDURE — 99213 OFFICE O/P EST LOW 20 MIN: CPT | Performed by: INTERNAL MEDICINE

## 2024-06-11 RX ORDER — HYDROXYZINE HYDROCHLORIDE 25 MG/1
25 TABLET, FILM COATED ORAL NIGHTLY PRN
COMMUNITY
Start: 2024-04-24 | End: 2024-06-11

## 2024-06-11 RX ORDER — KETOCONAZOLE 20 MG/ML
1 SHAMPOO TOPICAL DAILY PRN
COMMUNITY
Start: 2024-04-24

## 2024-06-11 ASSESSMENT — ANXIETY QUESTIONNAIRES
GAD7 TOTAL SCORE: 0
1. FEELING NERVOUS, ANXIOUS, OR ON EDGE: NOT AT ALL
6. BECOMING EASILY ANNOYED OR IRRITABLE: NOT AT ALL
7. FEELING AFRAID AS IF SOMETHING AWFUL MIGHT HAPPEN: NOT AT ALL
IF YOU CHECKED OFF ANY PROBLEMS ON THIS QUESTIONNAIRE, HOW DIFFICULT HAVE THESE PROBLEMS MADE IT FOR YOU TO DO YOUR WORK, TAKE CARE OF THINGS AT HOME, OR GET ALONG WITH OTHER PEOPLE: NOT DIFFICULT AT ALL
2. NOT BEING ABLE TO STOP OR CONTROL WORRYING: NOT AT ALL
4. TROUBLE RELAXING: NOT AT ALL
3. WORRYING TOO MUCH ABOUT DIFFERENT THINGS: NOT AT ALL
5. BEING SO RESTLESS THAT IT IS HARD TO SIT STILL: NOT AT ALL

## 2024-06-11 ASSESSMENT — PATIENT HEALTH QUESTIONNAIRE - PHQ9
SUM OF ALL RESPONSES TO PHQ QUESTIONS 1-9: 0
SUM OF ALL RESPONSES TO PHQ9 QUESTIONS 1 & 2: 0
SUM OF ALL RESPONSES TO PHQ QUESTIONS 1-9: 0
1. LITTLE INTEREST OR PLEASURE IN DOING THINGS: NOT AT ALL
2. FEELING DOWN, DEPRESSED OR HOPELESS: NOT AT ALL

## 2024-06-11 NOTE — PROGRESS NOTES
Chief Complaint   Patient presents with    Hair/Scalp Problem     Pt is an acute with C/o excessive hair loss in the past month.        HPI:  Patient is here complaining of hair loss lately that has been excessive according to the patient over the course of the last 3 to 4-month.  She actually has been to dermatology and has received some cream and some shampoo that she used twice a week.  She claims that this has helped her scalp a little bit but still have a lot of hair thinning happening.  She denies any abdominal pain nausea vomiting dizziness lightheadedness blurred vision any loss of weight or gaining weight.  She had lost about 4 pounds but she has been exercising on a daily basis.  She claims that she is comfortable at her job and she is not stressed about it as she works from home.  We will check her blood work including TSH and CBC and sed rate..    Vitals:    06/11/24 1259   BP: 124/76   Pulse: 88   Temp: 97.3 °F (36.3 °C)   TempSrc: Temporal   SpO2: 93%   Weight: 89.8 kg (198 lb)   Height: 1.778 m (5' 10\")       General:  Patient alert and oriented x 3, NAD, pleasant  HEENT:  Atraumatic, normocephalic, PERRLA, EOMI, clear conjunctiva, TMs clear, nose-clear, throat - no erythema mild thinning of the hair on the scalp  Neck:  Supple, no goiter, no carotid bruits, no LAD  Lungs:  CTA   Heart:  RRR, no murmurs, gallops or rubs  Abdomen:  Soft/nt/nd, + bowel sounds  Lymph node examination: unremarkable  Neurological exam : unremarkable  Extremities:  No clubbing, cyanosis or edema  Skin: unremarkable      No results found for this visit on 06/11/24.     Assessment/Plan:      Outpatient Encounter Medications as of 6/11/2024   Medication Sig Dispense Refill    ketoconazole (NIZORAL) 2 % shampoo Apply 1 each topically daily as needed for Itching      [DISCONTINUED] hydrOXYzine HCl (ATARAX) 25 MG tablet Take 1 tablet by mouth nightly as needed for Itching (Patient not taking: Reported on 6/11/2024)

## 2024-06-14 DIAGNOSIS — E04.1 LEFT THYROID NODULE: ICD-10-CM

## 2024-06-14 DIAGNOSIS — E78.2 MODERATE MIXED HYPERLIPIDEMIA NOT REQUIRING STATIN THERAPY: ICD-10-CM

## 2024-06-14 DIAGNOSIS — L65.9 THINNING HAIR: ICD-10-CM

## 2024-06-14 DIAGNOSIS — R73.01 IFG (IMPAIRED FASTING GLUCOSE): ICD-10-CM

## 2024-06-14 LAB
ALBUMIN: 3.9 G/DL (ref 3.5–5.2)
ALP BLD-CCNC: 66 U/L (ref 35–104)
ALT SERPL-CCNC: 25 U/L (ref 0–32)
ANION GAP SERPL CALCULATED.3IONS-SCNC: 16 MMOL/L (ref 7–16)
AST SERPL-CCNC: 30 U/L (ref 0–31)
BASOPHILS ABSOLUTE: 0.02 K/UL (ref 0–0.2)
BASOPHILS RELATIVE PERCENT: 0 % (ref 0–2)
BILIRUB SERPL-MCNC: 0.5 MG/DL (ref 0–1.2)
BUN BLDV-MCNC: 10 MG/DL (ref 6–20)
C-REACTIVE PROTEIN: 47 MG/L (ref 0–5)
CALCIUM SERPL-MCNC: 9.5 MG/DL (ref 8.6–10.2)
CHLORIDE BLD-SCNC: 104 MMOL/L (ref 98–107)
CHOLESTEROL, TOTAL: 199 MG/DL
CO2: 23 MMOL/L (ref 22–29)
CREAT SERPL-MCNC: 1 MG/DL (ref 0.5–1)
EOSINOPHILS ABSOLUTE: 0.17 K/UL (ref 0.05–0.5)
EOSINOPHILS RELATIVE PERCENT: 2 % (ref 0–6)
GFR, ESTIMATED: 67 ML/MIN/1.73M2
GLUCOSE BLD-MCNC: 119 MG/DL (ref 74–99)
HCT VFR BLD CALC: 42.9 % (ref 34–48)
HDLC SERPL-MCNC: 55 MG/DL
HEMOGLOBIN: 14.2 G/DL (ref 11.5–15.5)
IMMATURE GRANULOCYTES %: 0 % (ref 0–5)
IMMATURE GRANULOCYTES ABSOLUTE: <0.03 K/UL (ref 0–0.58)
LDL CHOLESTEROL: 130 MG/DL
LYMPHOCYTES ABSOLUTE: 2.03 K/UL (ref 1.5–4)
LYMPHOCYTES RELATIVE PERCENT: 28 % (ref 20–42)
MCH RBC QN AUTO: 29.8 PG (ref 26–35)
MCHC RBC AUTO-ENTMCNC: 33.1 G/DL (ref 32–34.5)
MCV RBC AUTO: 90.1 FL (ref 80–99.9)
MONOCYTES ABSOLUTE: 0.75 K/UL (ref 0.1–0.95)
MONOCYTES RELATIVE PERCENT: 10 % (ref 2–12)
NEUTROPHILS ABSOLUTE: 4.24 K/UL (ref 1.8–7.3)
NEUTROPHILS RELATIVE PERCENT: 59 % (ref 43–80)
PDW BLD-RTO: 12.5 % (ref 11.5–15)
PLATELET # BLD: 213 K/UL (ref 130–450)
PMV BLD AUTO: 11.4 FL (ref 7–12)
POTASSIUM SERPL-SCNC: 4.7 MMOL/L (ref 3.5–5)
RBC # BLD: 4.76 M/UL (ref 3.5–5.5)
SED RATE, AUTOMATED: 59 MM/HR (ref 0–20)
SODIUM BLD-SCNC: 143 MMOL/L (ref 132–146)
T4 FREE: 1.4 NG/DL (ref 0.9–1.7)
TOTAL PROTEIN: 7.4 G/DL (ref 6.4–8.3)
TRIGL SERPL-MCNC: 72 MG/DL
TSH SERPL DL<=0.05 MIU/L-ACNC: 2.79 UIU/ML (ref 0.27–4.2)
VLDLC SERPL CALC-MCNC: 14 MG/DL
WBC # BLD: 7.2 K/UL (ref 4.5–11.5)

## 2024-06-18 DIAGNOSIS — R70.0 ELEVATED SED RATE: ICD-10-CM

## 2024-06-18 DIAGNOSIS — L65.9 HAIR THINNING: Primary | ICD-10-CM

## 2024-06-18 NOTE — RESULT ENCOUNTER NOTE
Contacted this pt at home and advised her to return office call regarding blood work results, and further instructions.

## 2024-06-25 DIAGNOSIS — L65.9 HAIR THINNING: ICD-10-CM

## 2024-06-25 DIAGNOSIS — R70.0 ELEVATED SED RATE: ICD-10-CM

## 2024-06-25 LAB — RHEUMATOID FACTOR: <10 IU/ML (ref 0–13)

## 2024-06-26 LAB — ANTI-NUCLEAR ANTIBODY (ANA): NEGATIVE
